# Patient Record
Sex: MALE | Race: WHITE | NOT HISPANIC OR LATINO | Employment: OTHER | RURAL
[De-identification: names, ages, dates, MRNs, and addresses within clinical notes are randomized per-mention and may not be internally consistent; named-entity substitution may affect disease eponyms.]

---

## 2022-10-19 ENCOUNTER — HOSPITAL ENCOUNTER (EMERGENCY)
Facility: HOSPITAL | Age: 67
Discharge: OTHER INSTITUTION | End: 2022-10-19
Attending: EMERGENCY MEDICINE
Payer: MEDICARE

## 2022-10-19 VITALS
TEMPERATURE: 98 F | HEIGHT: 68 IN | BODY MASS INDEX: 28.34 KG/M2 | OXYGEN SATURATION: 98 % | WEIGHT: 187 LBS | HEART RATE: 73 BPM | RESPIRATION RATE: 18 BRPM | SYSTOLIC BLOOD PRESSURE: 114 MMHG | DIASTOLIC BLOOD PRESSURE: 66 MMHG

## 2022-10-19 DIAGNOSIS — M79.659 THIGH PAIN: ICD-10-CM

## 2022-10-19 DIAGNOSIS — E27.49 ADRENAL HEMORRHAGE: ICD-10-CM

## 2022-10-19 DIAGNOSIS — M25.559 HIP PAIN: ICD-10-CM

## 2022-10-19 DIAGNOSIS — S36.113A LACERATION OF LIVER, INITIAL ENCOUNTER: ICD-10-CM

## 2022-10-19 DIAGNOSIS — M25.539 WRIST PAIN: ICD-10-CM

## 2022-10-19 DIAGNOSIS — S63.095A CLOSED PERILUNATE DISLOCATION OF LEFT WRIST, INITIAL ENCOUNTER: Primary | ICD-10-CM
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
## 2022-10-19 LAB
ABORH RETYPE: NORMAL
ALBUMIN SERPL BCP-MCNC: 3.8 G/DL (ref 3.5–5)
ALBUMIN/GLOB SERPL: 1.2 {RATIO}
ALP SERPL-CCNC: 55 U/L (ref 45–115)
ALT SERPL W P-5'-P-CCNC: 252 U/L (ref 16–61)
AMPHET UR QL SCN: NEGATIVE
ANION GAP SERPL CALCULATED.3IONS-SCNC: 12 MMOL/L (ref 7–16)
APTT PPP: 25.6 SECONDS (ref 25.2–37.3)
AST SERPL W P-5'-P-CCNC: 276 U/L (ref 15–37)
BACTERIA #/AREA URNS HPF: ABNORMAL /HPF
BARBITURATES UR QL SCN: NEGATIVE
BASOPHILS # BLD AUTO: 0.09 K/UL (ref 0–0.2)
BASOPHILS NFR BLD AUTO: 0.4 % (ref 0–1)
BENZODIAZ METAB UR QL SCN: NEGATIVE
BILIRUB SERPL-MCNC: 0.8 MG/DL (ref ?–1.2)
BILIRUB UR QL STRIP: NEGATIVE
BUN SERPL-MCNC: 23 MG/DL (ref 7–18)
BUN/CREAT SERPL: 19 (ref 6–20)
CALCIUM SERPL-MCNC: 8.8 MG/DL (ref 8.5–10.1)
CANNABINOIDS UR QL SCN: NEGATIVE
CHLORIDE SERPL-SCNC: 102 MMOL/L (ref 98–107)
CLARITY UR: CLEAR
CO2 SERPL-SCNC: 29 MMOL/L (ref 21–32)
COCAINE UR QL SCN: NEGATIVE
COLOR UR: YELLOW
CREAT SERPL-MCNC: 1.23 MG/DL (ref 0.7–1.3)
DIFFERENTIAL METHOD BLD: ABNORMAL
EGFR (NO RACE VARIABLE) (RUSH/TITUS): 64 ML/MIN/1.73M²
EOSINOPHIL # BLD AUTO: 0.06 K/UL (ref 0–0.5)
EOSINOPHIL NFR BLD AUTO: 0.3 % (ref 1–4)
ERYTHROCYTE [DISTWIDTH] IN BLOOD BY AUTOMATED COUNT: 12.9 % (ref 11.5–14.5)
ETHANOL SERPL-MCNC: 6 MG/DL
GLOBULIN SER-MCNC: 3.1 G/DL (ref 2–4)
GLUCOSE SERPL-MCNC: 184 MG/DL (ref 74–106)
GLUCOSE UR STRIP-MCNC: 100 MG/DL
HCT VFR BLD AUTO: 40.3 % (ref 40–54)
HGB BLD-MCNC: 14.1 G/DL (ref 13.5–18)
HOLD SPECIMEN: NORMAL
IMM GRANULOCYTES # BLD AUTO: 0.15 K/UL (ref 0–0.04)
IMM GRANULOCYTES NFR BLD: 0.6 % (ref 0–0.4)
INDIRECT COOMBS: NORMAL
INR BLD: 1.07
KETONES UR STRIP-SCNC: 20 MG/DL
LACTATE SERPL-SCNC: 2.8 MMOL/L (ref 0.4–2)
LACTATE SERPL-SCNC: 2.8 MMOL/L (ref 0.4–2)
LEUKOCYTE ESTERASE UR QL STRIP: NEGATIVE
LYMPHOCYTES # BLD AUTO: 1.9 K/UL (ref 1–4.8)
LYMPHOCYTES NFR BLD AUTO: 8.1 % (ref 27–41)
MCH RBC QN AUTO: 30.8 PG (ref 27–31)
MCHC RBC AUTO-ENTMCNC: 35 G/DL (ref 32–36)
MCV RBC AUTO: 88 FL (ref 80–96)
MONOCYTES # BLD AUTO: 1.64 K/UL (ref 0–0.8)
MONOCYTES NFR BLD AUTO: 7 % (ref 2–6)
MPC BLD CALC-MCNC: 9.8 FL (ref 9.4–12.4)
MUCOUS, UA: ABNORMAL /LPF
NEUTROPHILS # BLD AUTO: 19.66 K/UL (ref 1.8–7.7)
NEUTROPHILS NFR BLD AUTO: 83.6 % (ref 53–65)
NITRITE UR QL STRIP: NEGATIVE
NRBC # BLD AUTO: 0 X10E3/UL
NRBC, AUTO (.00): 0 %
OPIATES UR QL SCN: NEGATIVE
PCP UR QL SCN: NEGATIVE
PH UR STRIP: 6 PH UNITS
PLATELET # BLD AUTO: 279 K/UL (ref 150–400)
POTASSIUM SERPL-SCNC: 2.5 MMOL/L (ref 3.5–5.1)
PROT SERPL-MCNC: 6.9 G/DL (ref 6.4–8.2)
PROT UR QL STRIP: 300
PROTHROMBIN TIME: 13.5 SECONDS (ref 11.7–14.7)
RBC # BLD AUTO: 4.58 M/UL (ref 4.6–6.2)
RBC # UR STRIP: ABNORMAL /UL
RBC #/AREA URNS HPF: 60 /HPF
RH BLD: NORMAL
SODIUM SERPL-SCNC: 140 MMOL/L (ref 136–145)
SP GR UR STRIP: 1.02
UROBILINOGEN UR STRIP-ACNC: NORMAL MG/DL
WBC # BLD AUTO: 23.5 K/UL (ref 4.5–11)
WBC #/AREA URNS HPF: 14 /HPF

## 2022-10-19 PROCEDURE — 99292 CRITICAL CARE ADDL 30 MIN: CPT

## 2022-10-19 PROCEDURE — 96374 THER/PROPH/DIAG INJ IV PUSH: CPT

## 2022-10-19 PROCEDURE — 96361 HYDRATE IV INFUSION ADD-ON: CPT | Mod: GZ

## 2022-10-19 PROCEDURE — 85025 COMPLETE CBC W/AUTO DIFF WBC: CPT | Performed by: EMERGENCY MEDICINE

## 2022-10-19 PROCEDURE — 25500020 PHARM REV CODE 255: Performed by: EMERGENCY MEDICINE

## 2022-10-19 PROCEDURE — 81001 URINALYSIS AUTO W/SCOPE: CPT | Mod: 59 | Performed by: EMERGENCY MEDICINE

## 2022-10-19 PROCEDURE — 83605 ASSAY OF LACTIC ACID: CPT | Performed by: EMERGENCY MEDICINE

## 2022-10-19 PROCEDURE — 80053 COMPREHEN METABOLIC PANEL: CPT | Performed by: EMERGENCY MEDICINE

## 2022-10-19 PROCEDURE — 82077 ASSAY SPEC XCP UR&BREATH IA: CPT | Performed by: EMERGENCY MEDICINE

## 2022-10-19 PROCEDURE — 85730 THROMBOPLASTIN TIME PARTIAL: CPT | Performed by: EMERGENCY MEDICINE

## 2022-10-19 PROCEDURE — 25000003 PHARM REV CODE 250: Performed by: EMERGENCY MEDICINE

## 2022-10-19 PROCEDURE — 99284 PR EMERGENCY DEPT VISIT,LEVEL IV: ICD-10-PCS | Mod: ,,, | Performed by: EMERGENCY MEDICINE

## 2022-10-19 PROCEDURE — 99284 EMERGENCY DEPT VISIT MOD MDM: CPT | Mod: ,,, | Performed by: EMERGENCY MEDICINE

## 2022-10-19 PROCEDURE — 36415 COLL VENOUS BLD VENIPUNCTURE: CPT | Performed by: EMERGENCY MEDICINE

## 2022-10-19 PROCEDURE — 99291 CRITICAL CARE FIRST HOUR: CPT

## 2022-10-19 PROCEDURE — G0390 TRAUMA RESPONS W/HOSP CRITI: HCPCS | Mod: TRAUMA2 | Performed by: EMERGENCY MEDICINE

## 2022-10-19 PROCEDURE — 85610 PROTHROMBIN TIME: CPT | Performed by: EMERGENCY MEDICINE

## 2022-10-19 PROCEDURE — 87086 URINE CULTURE/COLONY COUNT: CPT | Performed by: EMERGENCY MEDICINE

## 2022-10-19 PROCEDURE — 63600175 PHARM REV CODE 636 W HCPCS: Performed by: EMERGENCY MEDICINE

## 2022-10-19 PROCEDURE — 99285 EMERGENCY DEPT VISIT HI MDM: CPT | Mod: 25

## 2022-10-19 PROCEDURE — 80307 DRUG TEST PRSMV CHEM ANLYZR: CPT | Performed by: EMERGENCY MEDICINE

## 2022-10-19 PROCEDURE — 96375 TX/PRO/DX INJ NEW DRUG ADDON: CPT

## 2022-10-19 PROCEDURE — 96376 TX/PRO/DX INJ SAME DRUG ADON: CPT | Mod: 59

## 2022-10-19 PROCEDURE — 86901 BLOOD TYPING SEROLOGIC RH(D): CPT | Performed by: EMERGENCY MEDICINE

## 2022-10-19 RX ORDER — AMLODIPINE BESYLATE 10 MG/1
10 TABLET ORAL DAILY
COMMUNITY

## 2022-10-19 RX ORDER — LISINOPRIL 10 MG/1
10 TABLET ORAL 2 TIMES DAILY
COMMUNITY

## 2022-10-19 RX ORDER — HYDROMORPHONE HYDROCHLORIDE 2 MG/ML
1 INJECTION, SOLUTION INTRAMUSCULAR; INTRAVENOUS; SUBCUTANEOUS
Status: COMPLETED | OUTPATIENT
Start: 2022-10-19 | End: 2022-10-19

## 2022-10-19 RX ORDER — ONDANSETRON 2 MG/ML
4 INJECTION INTRAMUSCULAR; INTRAVENOUS
Status: COMPLETED | OUTPATIENT
Start: 2022-10-19 | End: 2022-10-19

## 2022-10-19 RX ORDER — POTASSIUM CHLORIDE 20 MEQ/1
40 TABLET, EXTENDED RELEASE ORAL
Status: COMPLETED | OUTPATIENT
Start: 2022-10-19 | End: 2022-10-19

## 2022-10-19 RX ADMIN — HYDROMORPHONE HYDROCHLORIDE 1 MG: 2 INJECTION INTRAMUSCULAR; INTRAVENOUS; SUBCUTANEOUS at 08:10

## 2022-10-19 RX ADMIN — HYDROMORPHONE HYDROCHLORIDE 1 MG: 2 INJECTION INTRAMUSCULAR; INTRAVENOUS; SUBCUTANEOUS at 11:10

## 2022-10-19 RX ADMIN — IOPAMIDOL 100 ML: 755 INJECTION, SOLUTION INTRAVENOUS at 08:10

## 2022-10-19 RX ADMIN — ONDANSETRON HYDROCHLORIDE 4 MG: 2 SOLUTION INTRAMUSCULAR; INTRAVENOUS at 08:10

## 2022-10-19 RX ADMIN — PROMETHAZINE HYDROCHLORIDE 25 MG: 25 INJECTION INTRAMUSCULAR; INTRAVENOUS at 11:10

## 2022-10-19 RX ADMIN — SODIUM CHLORIDE 1000 ML: 9 INJECTION, SOLUTION INTRAVENOUS at 09:10

## 2022-10-19 RX ADMIN — POTASSIUM CHLORIDE 40 MEQ: 1500 TABLET, EXTENDED RELEASE ORAL at 10:10

## 2022-10-20 NOTE — ED PROVIDER NOTES
Encounter Date: 10/19/2022    SCRIBE #1 NOTE: I, Tia Bautista, am scribing for, and in the presence of,  Segun Valdez MD. I have scribed the entire note.     History     Chief Complaint   Patient presents with    Fall     From from tree stand states 20 feet - stand broke and he tried to catch tree on way down - L hip wrist pain - abrasions noted to head and  R flank      The patient is a 67 y.o. male that presents to the emergency department due to falling out of a tree stand. The patient explains that he was in the stand about 20 feet in the air and it broke and he reached for a tree branch on his way down and it broke. He landed on he left leg. Afterward he could not walk so a friend picked him up on the [place] and he has not been able to bare weight on that leg since. The patient denies any loss of consciousness, headache, neck pain, abdominal pain, lower leg pain, or chest pain. The patient is experiencing some right sided back pain, right sided chest tightness,upper left leg pain, and left wrist pain. The patient does take HBP medication. No other medical hx or symptoms were reported.     The history is provided by the patient. No  was used.   Review of patient's allergies indicates:  No Known Allergies  Past Medical History:   Diagnosis Date    Hypertension      History reviewed. No pertinent surgical history.  History reviewed. No pertinent family history.  Social History     Tobacco Use    Smoking status: Former     Types: Cigarettes    Smokeless tobacco: Former     Review of Systems   Constitutional: Negative.    HENT: Negative.     Eyes: Negative.    Respiratory:  Positive for chest tightness (right).    Cardiovascular:  Negative for chest pain.   Gastrointestinal:  Negative for abdominal pain.   Endocrine: Negative.    Genitourinary: Negative.    Musculoskeletal:  Positive for back pain (right side). Negative for neck pain and neck stiffness.        Left wrist pain     Allergic/Immunologic: Negative.    Neurological:  Negative for dizziness, light-headedness and headaches.   Hematological: Negative.    Psychiatric/Behavioral: Negative.     All other systems reviewed and are negative.    Physical Exam     Initial Vitals   BP Pulse Resp Temp SpO2   10/19/22 1918 10/19/22 1918 10/19/22 1918 10/19/22 1926 10/19/22 1918   (!) 165/82 78 18 98 °F (36.7 °C) (!) 92 %      MAP       --                Physical Exam    Constitutional: He appears well-developed and well-nourished.   Neck: Neck supple.   Normal range of motion.  Cardiovascular:  Normal rate, regular rhythm, normal heart sounds and intact distal pulses.           Pulmonary/Chest: Breath sounds normal.   Tenderness right chest    Abdominal: Abdomen is soft.   Musculoskeletal:         General: Normal range of motion.      Left wrist: Swelling and tenderness present.      Cervical back: Normal range of motion and neck supple.      Left upper leg: Tenderness present.      Comments: Bruising in left wrist      Neurological: He is alert and oriented to person, place, and time. He has normal strength and normal reflexes.   Skin: Skin is warm and dry.   Psychiatric: He has a normal mood and affect. His behavior is normal. Judgment and thought content normal.       ED Course   Procedures  Labs Reviewed   COMPREHENSIVE METABOLIC PANEL - Abnormal; Notable for the following components:       Result Value    Potassium 2.5 (*)     Glucose 184 (*)     BUN 23 (*)      (*)      (*)     All other components within normal limits   LACTIC ACID, PLASMA - Abnormal; Notable for the following components:    Lactic Acid 2.8 (*)     All other components within normal limits   URINALYSIS, REFLEX TO URINE CULTURE - Abnormal; Notable for the following components:    Protein,  (*)     Glucose,  (*)     Ketones, UA 20 (*)     Blood, UA Large (*)     All other components within normal limits   CBC WITH DIFFERENTIAL - Abnormal;  Notable for the following components:    WBC 23.50 (*)     RBC 4.58 (*)     Neutrophils % 83.6 (*)     Lymphocytes % 8.1 (*)     Monocytes % 7.0 (*)     Eosinophils % 0.3 (*)     Immature Granulocytes % 0.6 (*)     Neutrophils, Abs 19.66 (*)     Monocytes, Absolute 1.64 (*)     Immature Granulocytes, Absolute 0.15 (*)     All other components within normal limits   URINALYSIS, MICROSCOPIC - Abnormal; Notable for the following components:    WBC, UA 14 (*)     RBC, UA 60 (*)     Bacteria, UA Occasional (*)     Mucous Occasional (*)     All other components within normal limits   PROTIME-INR - Normal   APTT - Normal   DRUG SCREEN, URINE (BEAKER) - Normal    Narrative:     The results of screening tests should be considered presumptive. Confirmatory testing is available upon request.    Cutoff Points:  PCP:         25ng/mL  AMPH:        500ng/mL  YASMIN:        200ng/mL  MATT:        200ng/mL  THC:         50ng/mL  GLORIA:         300ng/mL  OPI:         2000ng/mL   ALCOHOL,MEDICAL (ETHANOL) - Normal   CULTURE, URINE   CBC W/ AUTO DIFFERENTIAL    Narrative:     The following orders were created for panel order CBC auto differential.  Procedure                               Abnormality         Status                     ---------                               -----------         ------                     CBC with Differential[[id number]]        Abnormal            Final result                 Please view results for these tests on the individual orders.   LAVENDER TOP HOLD   EXTRA TUBES    Narrative:     The following orders were created for panel order EXTRA TUBES.  Procedure                               Abnormality         Status                     ---------                               -----------         ------                     Light Green Top Hold[[id number]]                             In process                 Lavender Top Hold[[id number]]                                Final result                 Please view  results for these tests on the individual orders.   LIGHT GREEN TOP HOLD   LACTIC ACID, PLASMA   TYPE & SCREEN   ABORH RETYPE          Imaging Results              CT Chest Abdomen Pelvis With Contrast (Final result)  Result time 10/19/22 21:11:13      Final result by Víctor Martinez DO (10/19/22 21:11:13)                   Impression:      There is right adrenal hematoma/hemorrhage measuring up to 4.3 x 1.8 cm in largest axial dimension. Small hemorrhage extends posteriorly Morrisons pouch and along the dorsal right lobe of liver.  Question subtle contusion/laceration involving the dorsal/medial right lobe of liver.  No convincing active extravasation.  Surrounding fat stranding/edema.    Lobulated hepatic cyst adjacent to gallbladder fossa.  Indeterminate peripherally hypodense centrally hyperdense lesion within the lateral right lobe of liver measuring up to 1.8 cm. Differential includes hemangioma and other neoplasm. Recommend right upper quadrant ultrasound for further evaluation.    Prostate enlargement.  Other/detailed findings as above.    The CT exam was performed using one or more of the following dose    reduction techniques- Automated exposure control, adjustment of the mA    and/or kV according to patient size, and/or use of iterative    reconstructed technique.    Point of Service: [facility]      Electronically signed by: Víctor Martinez  Date:    10/19/2022  Time:    21:11               Narrative:    EXAMINATION:  CT CHEST ABDOMEN PELVIS WITH CONTRAST (XPD)    CLINICAL HISTORY:  Polytrauma, blunt;    COMPARISON:  None    TECHNIQUE:  Multiple axial tomographic images of the chest, abdomen, and pelvis were obtained after administration of 100 cc Isovue 370 intravenous contrast.    FINDINGS:  Mild-to-moderate cardiomegaly.  No convincing acute thoracic aortic injury.  Mild dependent changes of the lungs noted.  No belkys pulmonary edema or focal consolidating pneumonia.  No  pneumothorax.    Lobulated hepatic cyst adjacent to gallbladder fossa.  Indeterminate peripherally hypodense centrally hyperdense lesion within the lateral right lobe of liver measuring up to 1.8 cm.  Differential includes hemangioma and other neoplasm.  Recommend right upper quadrant ultrasound for further evaluation.  Visualized gallbladder grossly unremarkable.  Visualized pancreas appears unremarkable.  Spleen grossly unremarkable.  Left adrenal gland grossly unremarkable.  There is right adrenal hematoma/hemorrhage measuring up to 4.3 x 1.8 cm in largest axial dimension.  Small hemorrhage extends posteriorly Morrisons pouch and along dorsal right lobe of liver.  Surrounding fat stranding/edema.  Question subtle contusion/laceration involving the dorsal/medial right lobe of liver. No convincing active extravasation.  No evidence of hydronephrosis.  Urinary bladder incompletely distended.  Prostate enlargement with coarse calcification.  Anastomotic suture material noted at the rectosigmoid junction.  No evidence of gastrointestinal obstruction.  Atherosclerotic calcification demonstrated.    Scattered skeletal degenerative change.  Chronic deformity of anterior left rib 8.                                       CT Wrist Without Contrast Left (In process)                      X-Ray Femur AP/LAT Left (Final result)  Result time 10/19/22 20:15:20      Final result by Víctor Martinez DO (10/19/22 20:15:20)                   Impression:      As above.    Point of Service: [facility]      Electronically signed by: Víctor Martienz  Date:    10/19/2022  Time:    20:15               Narrative:    EXAMINATION:  XR HIP WITH PELVIS WHEN PERFORMED, 2 OR 3 VIEWS LEFT; XR FEMUR 2 VIEW LEFT    CLINICAL HISTORY:  Pain in unspecified hip; Pain in unspecified thigh    COMPARISON:  None    TECHNIQUE:  Single frontal view of the pelvis as well as frontal and frogleg lateral views of the left hip.  Frontal and lateral views  of the left femur.    FINDINGS:  No convincing acute fracture or dislocation demonstrated. No concerning radiopaque foreign body visualized.                                       X-Ray Wrist Complete Left (Final result)  Result time 10/19/22 20:13:33      Final result by Víctor Martinez DO (10/19/22 20:13:33)                   Impression:      As above.    Point of Service: [facility]      Electronically signed by: Víctor Martinez  Date:    10/19/2022  Time:    20:13               Narrative:    EXAMINATION:  XR WRIST COMPLETE 3 VIEWS LEFT    CLINICAL HISTORY:  Pain in unspecified wrist    COMPARISON:  None    TECHNIQUE:  Frontal, lateral, and oblique views of the left wrist.    FINDINGS:  There is an acute, mildly displaced radial styloid fracture.  There is a perilunate dislocation with the capitate located dorsally and proximally.  There is some widening of the scapholunate interval suggestive of ligamentous injury.  Soft tissue swelling present.                                       X-Ray Hip 2 or 3 views Left (with Pelvis when performed) (Final result)  Result time 10/19/22 20:15:20      Final result by Víctor Martinez DO (10/19/22 20:15:20)                   Impression:      As above.    Point of Service: [facility]      Electronically signed by: Víctor Martinez  Date:    10/19/2022  Time:    20:15               Narrative:    EXAMINATION:  XR HIP WITH PELVIS WHEN PERFORMED, 2 OR 3 VIEWS LEFT; XR FEMUR 2 VIEW LEFT    CLINICAL HISTORY:  Pain in unspecified hip; Pain in unspecified thigh    COMPARISON:  None    TECHNIQUE:  Single frontal view of the pelvis as well as frontal and frogleg lateral views of the left hip.  Frontal and lateral views of the left femur.    FINDINGS:  No convincing acute fracture or dislocation demonstrated. No concerning radiopaque foreign body visualized.                                       X-Ray Chest AP Portable (Final result)  Result time 10/19/22 20:16:13       Final result by Víctor Martinez DO (10/19/22 20:16:13)                   Impression:      No acute cardiopulmonary process demonstrated.    Point of Service: [facility]      Electronically signed by: Víctor Martinez  Date:    10/19/2022  Time:    20:16               Narrative:    EXAMINATION:  XR CHEST AP PORTABLE    CLINICAL HISTORY:  Chest wall pain;    COMPARISON:  None    TECHNIQUE:  Frontal view/views of the chest.    FINDINGS:  Heart size appears within normal limits.  No focal consolidation, pleural effusion, or pneumothorax.  Visualized osseous and surrounding soft tissue structures demonstrate no acute abnormality.                                       Medications   HYDROmorphone (PF) injection 1 mg (1 mg Intravenous Given 10/19/22 2018)   ondansetron injection 4 mg (4 mg Intravenous Given 10/19/22 2016)   iopamidoL (ISOVUE-370) injection 100 mL (100 mLs Intravenous Given 10/19/22 2053)   sodium chloride 0.9% bolus 1,000 mL (1,000 mLs Intravenous New Bag 10/19/22 2115)   potassium chloride SA CR tablet 40 mEq (40 mEq Oral Given 10/19/22 2216)     Medical Decision Making:   ED Management:  21:30 Dr. Valdez spoke with Dr. MENDOZA Clark about the patient   22:09 Dr. Valdez spoke with [facility] about transferring the patient           Attending Attestation:           Physician Attestation for Scribe:  Physician Attestation Statement for Scribe #1: I, Segun Valdez MD, reviewed documentation, as scribed by Tia Bautista in my presence, and it is both accurate and complete.           ED Course as of 10/19/22 2218   Wed Oct 19, 2022   2102 WBC(!): 23.50 [BB]   2104 WBC, UA(!): 14 [BB]   2104 RBC, UA(!): 60 [BB]   2104 BUN(!): 23 [BB]   2104 Creatinine: 1.23 [BB]   2205 Patient wanted to be transferred to [place] but [facility] was unable to accept the patient.  Discussed case with [facility] in [place] and patient was accepted there.  Accepting physician is Dr. Grissom. [BB]      ED Course User Index  [BB] Segun Valdez MD                    Clinical Impression:   Final diagnoses:  [M25.559] Hip pain  [M79.659] Thigh pain  [M25.539] Wrist pain  [S63.095A] Closed perilunate dislocation of left wrist, initial encounter (Primary)  [S36.113A] Laceration of liver, initial encounter  [E27.49] Adrenal hemorrhage  [W19.XXXA] Fall, initial encounter      ED Disposition Condition    Transfer to Another Facility Stable                Segun Valdez MD  10/19/22 2096

## 2022-10-21 LAB — UA COMPLETE W REFLEX CULTURE PNL UR: NO GROWTH
